# Patient Record
Sex: MALE | Race: WHITE | NOT HISPANIC OR LATINO | Employment: FULL TIME | URBAN - NONMETROPOLITAN AREA
[De-identification: names, ages, dates, MRNs, and addresses within clinical notes are randomized per-mention and may not be internally consistent; named-entity substitution may affect disease eponyms.]

---

## 2023-02-02 PROBLEM — R73.9 ELEVATED BLOOD SUGAR: Status: ACTIVE | Noted: 2023-02-02

## 2023-02-02 PROBLEM — G25.81 RLS (RESTLESS LEGS SYNDROME): Status: ACTIVE | Noted: 2023-02-02

## 2023-02-02 PROBLEM — N52.9 ERECTILE DYSFUNCTION: Status: ACTIVE | Noted: 2023-02-02

## 2023-02-02 PROBLEM — M17.9 KNEE OSTEOARTHRITIS: Status: ACTIVE | Noted: 2023-02-02

## 2023-02-02 PROBLEM — H26.9 CATARACT, LEFT: Status: ACTIVE | Noted: 2023-02-02

## 2023-02-02 PROBLEM — J45.909 ASTHMA (HHS-HCC): Status: ACTIVE | Noted: 2023-02-02

## 2023-02-02 PROBLEM — J06.9 URI WITH COUGH AND CONGESTION: Status: ACTIVE | Noted: 2023-02-02

## 2023-02-02 PROBLEM — I10 HYPERTENSION: Status: ACTIVE | Noted: 2023-02-02

## 2023-02-02 PROBLEM — E29.1 TESTICULAR HYPOFUNCTION: Status: ACTIVE | Noted: 2023-02-02

## 2023-02-02 PROBLEM — U07.1 COVID-19 VIRUS INFECTION: Status: ACTIVE | Noted: 2023-02-02

## 2023-02-02 PROBLEM — M67.432 GANGLION CYST OF BOTH WRISTS: Status: ACTIVE | Noted: 2023-02-02

## 2023-02-02 PROBLEM — M75.102 LEFT ROTATOR CUFF TEAR: Status: ACTIVE | Noted: 2023-02-02

## 2023-02-02 PROBLEM — M67.431 GANGLION CYST OF BOTH WRISTS: Status: ACTIVE | Noted: 2023-02-02

## 2023-02-02 PROBLEM — J01.00 ACUTE NON-RECURRENT MAXILLARY SINUSITIS: Status: ACTIVE | Noted: 2023-02-02

## 2023-02-02 PROBLEM — H66.91 OTITIS MEDIA, RIGHT: Status: ACTIVE | Noted: 2023-02-02

## 2023-02-02 RX ORDER — AMLODIPINE BESYLATE 5 MG/1
1 TABLET ORAL
COMMUNITY
Start: 2016-07-14 | End: 2024-02-06 | Stop reason: SDUPTHER

## 2023-02-02 RX ORDER — LOSARTAN POTASSIUM AND HYDROCHLOROTHIAZIDE 12.5; 1 MG/1; MG/1
1 TABLET ORAL NIGHTLY
COMMUNITY
Start: 2015-06-24 | End: 2024-02-06 | Stop reason: SDUPTHER

## 2023-02-02 RX ORDER — TADALAFIL 10 MG/1
10 TABLET ORAL DAILY PRN
COMMUNITY
Start: 2021-10-07

## 2023-02-02 RX ORDER — ALBUTEROL SULFATE 90 UG/1
AEROSOL, METERED RESPIRATORY (INHALATION)
COMMUNITY
Start: 2022-10-08

## 2023-02-14 RX ORDER — ROPINIROLE 0.25 MG/1
0.25 TABLET, FILM COATED ORAL NIGHTLY
COMMUNITY

## 2023-04-04 ENCOUNTER — OFFICE VISIT (OUTPATIENT)
Dept: PRIMARY CARE | Facility: CLINIC | Age: 53
End: 2023-04-04
Payer: COMMERCIAL

## 2023-04-04 VITALS
OXYGEN SATURATION: 95 % | RESPIRATION RATE: 14 BRPM | DIASTOLIC BLOOD PRESSURE: 70 MMHG | HEART RATE: 67 BPM | HEIGHT: 69 IN | BODY MASS INDEX: 31.99 KG/M2 | SYSTOLIC BLOOD PRESSURE: 124 MMHG | WEIGHT: 216 LBS | TEMPERATURE: 97.7 F

## 2023-04-04 DIAGNOSIS — J01.90 ACUTE NON-RECURRENT SINUSITIS, UNSPECIFIED LOCATION: ICD-10-CM

## 2023-04-04 DIAGNOSIS — I10 PRIMARY HYPERTENSION: Primary | ICD-10-CM

## 2023-04-04 DIAGNOSIS — G25.81 RLS (RESTLESS LEGS SYNDROME): ICD-10-CM

## 2023-04-04 PROBLEM — M17.0 PRIMARY OSTEOARTHRITIS OF BOTH KNEES: Status: ACTIVE | Noted: 2017-09-25

## 2023-04-04 PROBLEM — G51.0 BELL'S PALSY: Status: ACTIVE | Noted: 2022-10-29

## 2023-04-04 PROBLEM — Z86.59 H/O: DEPRESSION: Status: ACTIVE | Noted: 2021-01-14

## 2023-04-04 PROCEDURE — 1036F TOBACCO NON-USER: CPT | Performed by: FAMILY MEDICINE

## 2023-04-04 PROCEDURE — 99214 OFFICE O/P EST MOD 30 MIN: CPT | Performed by: FAMILY MEDICINE

## 2023-04-04 PROCEDURE — 3074F SYST BP LT 130 MM HG: CPT | Performed by: FAMILY MEDICINE

## 2023-04-04 PROCEDURE — 3078F DIAST BP <80 MM HG: CPT | Performed by: FAMILY MEDICINE

## 2023-04-04 RX ORDER — DOXYCYCLINE 100 MG/1
CAPSULE ORAL
Qty: 14 CAPSULE | Refills: 0 | Status: SHIPPED | OUTPATIENT
Start: 2023-04-04 | End: 2023-10-06 | Stop reason: ALTCHOICE

## 2023-04-04 ASSESSMENT — PAIN SCALES - GENERAL: PAINLEVEL: 0-NO PAIN

## 2023-04-04 NOTE — PROGRESS NOTES
"Subjective   Patient ID: Ted Salamanca Jr. is a 52 y.o. male who presents for Hypertension, Testosterone, Earache (Right ear), Nasal Congestion (Symptoms started last Wednesday, no fevers known ), and Bell's Palsy .    HPI   Ted was seen today for 6-month follow-up of his hypertension, restless leg symptoms, mild asthma.  Medication(s) are being taken and tolerated as prescribed, without concerns, list reconciled today.    Since his last visit, he had a fairly significant case of Bell's palsy, right-sided, symptoms not completely resolved.  His symptoms occurred within a week or 2 of an upper respiratory infection.    Labs from October reviewed, all were excellent.    His only minor concern today is that he has had about a week of upper respiratory congestion, right otalgia, and an occasionally productive cough, without fever.  Review of Systems  The full, 10+ multi-organ review of systems, is within normal limits with the exception of what is noted above in HPI.  Objective   /76 (BP Location: Right arm, Patient Position: Sitting, BP Cuff Size: Adult)   Pulse 67   Temp 36.5 °C (97.7 °F) (Temporal)   Resp 14   Ht 1.753 m (5' 9\")   Wt 98 kg (216 lb)   SpO2 95%   BMI 31.90 kg/m²     Physical Exam    Assessment/Plan     Hypertension--- since today's blood pressures are at goal, I have recommended continuing the current treatment regimen, including medication as noted above, as well as a low salt, low-fat, high-fiber diet.  Exercise is to be continued as able and tolerated.  We will continue to follow the high blood pressure on an every six-month basis, and address additional needs should they arise.    Restless leg symptoms, well controlled on low-dose Requip.    Status post right-sided Bell's palsy, hope for some improvement between now and the next visit.    Sinusitis symptoms, I printed a prescription for doxycycline, he will wait another 5 to 7 days, start only if symptoms worsen.    Follow-up in 6 " months, fasting.

## 2023-10-04 ASSESSMENT — ENCOUNTER SYMPTOMS
BLURRED VISION: 0
PND: 0
PALPITATIONS: 0
HEADACHES: 0
SWEATS: 0
SHORTNESS OF BREATH: 0
NECK PAIN: 0
HYPERTENSION: 1
ORTHOPNEA: 0

## 2023-10-06 ENCOUNTER — LAB (OUTPATIENT)
Dept: LAB | Facility: LAB | Age: 53
End: 2023-10-06
Payer: COMMERCIAL

## 2023-10-06 ENCOUNTER — OFFICE VISIT (OUTPATIENT)
Dept: PRIMARY CARE | Facility: CLINIC | Age: 53
End: 2023-10-06
Payer: COMMERCIAL

## 2023-10-06 VITALS
HEART RATE: 68 BPM | RESPIRATION RATE: 16 BRPM | DIASTOLIC BLOOD PRESSURE: 73 MMHG | OXYGEN SATURATION: 95 % | SYSTOLIC BLOOD PRESSURE: 116 MMHG | TEMPERATURE: 97.4 F | WEIGHT: 215.4 LBS | BODY MASS INDEX: 31.81 KG/M2

## 2023-10-06 DIAGNOSIS — I10 PRIMARY HYPERTENSION: ICD-10-CM

## 2023-10-06 DIAGNOSIS — G25.81 RLS (RESTLESS LEGS SYNDROME): ICD-10-CM

## 2023-10-06 DIAGNOSIS — Z12.5 SCREENING PSA (PROSTATE SPECIFIC ANTIGEN): ICD-10-CM

## 2023-10-06 DIAGNOSIS — Z23 IMMUNIZATION DUE: Primary | ICD-10-CM

## 2023-10-06 PROCEDURE — 81003 URINALYSIS AUTO W/O SCOPE: CPT

## 2023-10-06 PROCEDURE — 80061 LIPID PANEL: CPT

## 2023-10-06 PROCEDURE — 99214 OFFICE O/P EST MOD 30 MIN: CPT | Performed by: FAMILY MEDICINE

## 2023-10-06 PROCEDURE — 80053 COMPREHEN METABOLIC PANEL: CPT

## 2023-10-06 PROCEDURE — 90472 IMMUNIZATION ADMIN EACH ADD: CPT | Performed by: FAMILY MEDICINE

## 2023-10-06 PROCEDURE — 3078F DIAST BP <80 MM HG: CPT | Performed by: FAMILY MEDICINE

## 2023-10-06 PROCEDURE — 90471 IMMUNIZATION ADMIN: CPT | Performed by: FAMILY MEDICINE

## 2023-10-06 PROCEDURE — 84443 ASSAY THYROID STIM HORMONE: CPT

## 2023-10-06 PROCEDURE — 90750 HZV VACC RECOMBINANT IM: CPT | Performed by: FAMILY MEDICINE

## 2023-10-06 PROCEDURE — 90686 IIV4 VACC NO PRSV 0.5 ML IM: CPT | Performed by: FAMILY MEDICINE

## 2023-10-06 PROCEDURE — 84153 ASSAY OF PSA TOTAL: CPT

## 2023-10-06 PROCEDURE — 85025 COMPLETE CBC W/AUTO DIFF WBC: CPT

## 2023-10-06 PROCEDURE — 36415 COLL VENOUS BLD VENIPUNCTURE: CPT

## 2023-10-06 PROCEDURE — 1036F TOBACCO NON-USER: CPT | Performed by: FAMILY MEDICINE

## 2023-10-06 PROCEDURE — 3074F SYST BP LT 130 MM HG: CPT | Performed by: FAMILY MEDICINE

## 2023-10-06 ASSESSMENT — PATIENT HEALTH QUESTIONNAIRE - PHQ9
2. FEELING DOWN, DEPRESSED OR HOPELESS: NOT AT ALL
1. LITTLE INTEREST OR PLEASURE IN DOING THINGS: NOT AT ALL
SUM OF ALL RESPONSES TO PHQ9 QUESTIONS 1 AND 2: 0

## 2023-10-06 ASSESSMENT — PAIN SCALES - GENERAL: PAINLEVEL: 0-NO PAIN

## 2023-10-06 NOTE — PROGRESS NOTES
Answers submitted by the patient for this visit:  High Blood Pressure Questionnaire (Submitted on 10/4/2023)  Chief Complaint: Hypertension  Chronicity: chronic  Onset: more than 1 year ago  Progression since onset: unchanged  Condition status: controlled  anxiety: No  blurred vision: No  chest pain: No  headaches: No  malaise/fatigue: No  neck pain: No  orthopnea: No  palpitations: No  peripheral edema: No  PND: No  shortness of breath: No  sweats: No  Agents associated with hypertension: no associated agents  CAD risks: diabetes mellitus, family history  Compliance problems: no compliance problems

## 2023-10-06 NOTE — PROGRESS NOTES
Subjective   Patient ID: Ted Salamanca Jr. is a 53 y.o. male who presents for Hypertension.    HPI   Ted was seen today for a routine follow-up of his hypertension, restless leg symptoms.  Medication(s) are being taken and tolerated as prescribed, without concerns, list reconciled today.  There are no complaints of chest pain, shortness of breath, lower extremity edema, or exertional concerns  Work is going well, he has a less physical, less stressful job, which has been great for him.  He has a history of multiple orthopedic issues, that have done well postsurgery.  He takes ropinirole for his restless leg symptoms most nights, occasionally can get by without.  Blood pressures have been excellent, he is fasting for blood work, would like a flu vaccine.  Colonoscopy was normal in January of this year  Review of Systems  The full review of systems is negative with the exception of what is noted in HPI    Objective   /73 (BP Location: Right arm, Patient Position: Sitting, BP Cuff Size: Adult)   Pulse 68   Temp 36.3 °C (97.4 °F) (Temporal)   Resp 16   Wt 97.7 kg (215 lb 6.4 oz)   SpO2 95%   BMI 31.81 kg/m²     Physical Exam  Constitutional/General appearance: alert, oriented, well-appearing, in no distress  Head and face exam is normal  No scleral icterus or conjunctival erythema present  Hearing is grossly normal  Respiratory effort is normal, no dyspnea noted  Cortical function is normal  Mood, affect, are pleasant, appropriate, and interactive.  Insight is normal  Cardiac exam reveals a regular rate and rhythm, no murmurs, rubs or gallops present.   Lungs are clear bilaterally.    No lower extremity edema present.    Assessment/Plan     Hypertension--- since today's blood pressures are at goal, I have recommended continuing the current treatment regimen, including medication as noted above, as well as a low salt, low-fat, high-fiber diet.  Exercise is to be continued as able and tolerated.  We will  continue to follow the high blood pressure on an every six-month basis, and address additional needs should they arise.    Restless leg symptoms, well controlled on 0.25 mg of ropinirole    Flu and first Shingrix given today  Follow-up in 6 months    **Portions of this medical record have been created using voice recognition software and may have minor errors which are inherent in voice recognition systems. It has not been fully edited for typographical or grammatical errors**

## 2023-10-07 LAB
ALBUMIN SERPL BCP-MCNC: 4.6 G/DL (ref 3.4–5)
ALP SERPL-CCNC: 44 U/L (ref 33–120)
ALT SERPL W P-5'-P-CCNC: 17 U/L (ref 10–52)
ANION GAP SERPL CALC-SCNC: 14 MMOL/L (ref 10–20)
APPEARANCE UR: ABNORMAL
AST SERPL W P-5'-P-CCNC: 17 U/L (ref 9–39)
BASOPHILS # BLD AUTO: 0.03 X10*3/UL (ref 0–0.1)
BASOPHILS NFR BLD AUTO: 0.4 %
BILIRUB SERPL-MCNC: 0.7 MG/DL (ref 0–1.2)
BILIRUB UR STRIP.AUTO-MCNC: NEGATIVE MG/DL
BUN SERPL-MCNC: 13 MG/DL (ref 6–23)
CALCIUM SERPL-MCNC: 9.4 MG/DL (ref 8.6–10.6)
CHLORIDE SERPL-SCNC: 105 MMOL/L (ref 98–107)
CHOLEST SERPL-MCNC: 167 MG/DL (ref 0–199)
CHOLESTEROL/HDL RATIO: 3.5
CO2 SERPL-SCNC: 28 MMOL/L (ref 21–32)
COLOR UR: ABNORMAL
CREAT SERPL-MCNC: 1.15 MG/DL (ref 0.5–1.3)
EOSINOPHIL # BLD AUTO: 0.14 X10*3/UL (ref 0–0.7)
EOSINOPHIL NFR BLD AUTO: 1.9 %
ERYTHROCYTE [DISTWIDTH] IN BLOOD BY AUTOMATED COUNT: 12.2 % (ref 11.5–14.5)
GFR SERPL CREATININE-BSD FRML MDRD: 77 ML/MIN/1.73M*2
GLUCOSE SERPL-MCNC: 110 MG/DL (ref 74–99)
GLUCOSE UR STRIP.AUTO-MCNC: NEGATIVE MG/DL
HCT VFR BLD AUTO: 47.9 % (ref 41–52)
HDLC SERPL-MCNC: 48.4 MG/DL
HGB BLD-MCNC: 16.2 G/DL (ref 13.5–17.5)
IMM GRANULOCYTES # BLD AUTO: 0.01 X10*3/UL (ref 0–0.7)
IMM GRANULOCYTES NFR BLD AUTO: 0.1 % (ref 0–0.9)
KETONES UR STRIP.AUTO-MCNC: NEGATIVE MG/DL
LDLC SERPL CALC-MCNC: 97 MG/DL (ref 140–190)
LEUKOCYTE ESTERASE UR QL STRIP.AUTO: NEGATIVE
LYMPHOCYTES # BLD AUTO: 1.78 X10*3/UL (ref 1.2–4.8)
LYMPHOCYTES NFR BLD AUTO: 24.8 %
MCH RBC QN AUTO: 32.9 PG (ref 26–34)
MCHC RBC AUTO-ENTMCNC: 33.8 G/DL (ref 32–36)
MCV RBC AUTO: 97 FL (ref 80–100)
MONOCYTES # BLD AUTO: 0.71 X10*3/UL (ref 0.1–1)
MONOCYTES NFR BLD AUTO: 9.9 %
NEUTROPHILS # BLD AUTO: 4.52 X10*3/UL (ref 1.2–7.7)
NEUTROPHILS NFR BLD AUTO: 62.9 %
NITRITE UR QL STRIP.AUTO: NEGATIVE
NON HDL CHOLESTEROL: 119 MG/DL (ref 0–149)
NRBC BLD-RTO: 0 /100 WBCS (ref 0–0)
PH UR STRIP.AUTO: 6 [PH]
PLATELET # BLD AUTO: 248 X10*3/UL (ref 150–450)
PMV BLD AUTO: 10.5 FL (ref 7.5–11.5)
POTASSIUM SERPL-SCNC: 3.7 MMOL/L (ref 3.5–5.3)
PROT SERPL-MCNC: 7 G/DL (ref 6.4–8.2)
PROT UR STRIP.AUTO-MCNC: NEGATIVE MG/DL
PSA SERPL-MCNC: 0.8 NG/ML
RBC # BLD AUTO: 4.92 X10*6/UL (ref 4.5–5.9)
RBC # UR STRIP.AUTO: NEGATIVE /UL
SODIUM SERPL-SCNC: 143 MMOL/L (ref 136–145)
SP GR UR STRIP.AUTO: 1.02
TRIGL SERPL-MCNC: 109 MG/DL (ref 0–149)
TSH SERPL-ACNC: 2.39 MIU/L (ref 0.44–3.98)
UROBILINOGEN UR STRIP.AUTO-MCNC: <2 MG/DL
VLDL: 22 MG/DL (ref 0–40)
WBC # BLD AUTO: 7.2 X10*3/UL (ref 4.4–11.3)

## 2024-02-06 DIAGNOSIS — I10 HYPERTENSION, UNSPECIFIED TYPE: ICD-10-CM

## 2024-02-06 RX ORDER — AMLODIPINE BESYLATE 5 MG/1
5 TABLET ORAL
Qty: 90 TABLET | Refills: 1 | Status: SHIPPED | OUTPATIENT
Start: 2024-02-06

## 2024-02-06 RX ORDER — LOSARTAN POTASSIUM AND HYDROCHLOROTHIAZIDE 12.5; 1 MG/1; MG/1
1 TABLET ORAL NIGHTLY
Qty: 90 TABLET | Refills: 1 | Status: SHIPPED | OUTPATIENT
Start: 2024-02-06

## 2024-03-18 ENCOUNTER — TELEPHONE (OUTPATIENT)
Dept: PRIMARY CARE | Facility: CLINIC | Age: 54
End: 2024-03-18
Payer: COMMERCIAL

## 2024-03-18 NOTE — TELEPHONE ENCOUNTER
Patient dropped off FMLA paperwork, please fax to the number on form once completed and then call patient to  after.

## 2024-03-28 NOTE — TELEPHONE ENCOUNTER
----- Message from Natalie Sultana CMA sent at 3/28/2024  8:39 AM EDT -----  Regarding: FW: NOLAN  Contact: [phone]    ----- Message -----  From: Ted Salamanca Jr.  Sent: 3/28/2024   8:37 AM EDT  To: [place] [id number] Clinical Support Staff  Subject: NOLAN                                             Hi, I'm just inquiring about the [facility] paperwork. March 28th was the deadline to return.    Thank you   Ted Salmaanca

## 2024-03-28 NOTE — TELEPHONE ENCOUNTER
I completed it earlier this week...  (Was so he could miss work for his son Lonnie's health issues)

## 2024-04-09 ENCOUNTER — OFFICE VISIT (OUTPATIENT)
Dept: PRIMARY CARE | Facility: CLINIC | Age: 54
End: 2024-04-09
Payer: COMMERCIAL

## 2024-04-09 ENCOUNTER — LAB (OUTPATIENT)
Dept: LAB | Facility: LAB | Age: 54
End: 2024-04-09
Payer: COMMERCIAL

## 2024-04-09 VITALS
BODY MASS INDEX: 31.16 KG/M2 | TEMPERATURE: 97.2 F | HEART RATE: 64 BPM | OXYGEN SATURATION: 95 % | WEIGHT: 211 LBS | DIASTOLIC BLOOD PRESSURE: 71 MMHG | SYSTOLIC BLOOD PRESSURE: 115 MMHG

## 2024-04-09 DIAGNOSIS — Z23 IMMUNIZATION DUE: ICD-10-CM

## 2024-04-09 DIAGNOSIS — I10 PRIMARY HYPERTENSION: ICD-10-CM

## 2024-04-09 DIAGNOSIS — R73.9 ELEVATED BLOOD SUGAR: ICD-10-CM

## 2024-04-09 DIAGNOSIS — I10 PRIMARY HYPERTENSION: Primary | ICD-10-CM

## 2024-04-09 DIAGNOSIS — G25.81 RLS (RESTLESS LEGS SYNDROME): ICD-10-CM

## 2024-04-09 LAB
ALBUMIN SERPL BCP-MCNC: 4.4 G/DL (ref 3.4–5)
ALP SERPL-CCNC: 42 U/L (ref 33–120)
ALT SERPL W P-5'-P-CCNC: 16 U/L (ref 10–52)
ANION GAP SERPL CALC-SCNC: 12 MMOL/L (ref 10–20)
AST SERPL W P-5'-P-CCNC: 14 U/L (ref 9–39)
BILIRUB SERPL-MCNC: 0.7 MG/DL (ref 0–1.2)
BUN SERPL-MCNC: 23 MG/DL (ref 6–23)
CALCIUM SERPL-MCNC: 9.4 MG/DL (ref 8.6–10.6)
CHLORIDE SERPL-SCNC: 104 MMOL/L (ref 98–107)
CO2 SERPL-SCNC: 31 MMOL/L (ref 21–32)
CREAT SERPL-MCNC: 1.14 MG/DL (ref 0.5–1.3)
EGFRCR SERPLBLD CKD-EPI 2021: 78 ML/MIN/1.73M*2
EST. AVERAGE GLUCOSE BLD GHB EST-MCNC: 88 MG/DL
GLUCOSE SERPL-MCNC: 106 MG/DL (ref 74–99)
HBA1C MFR BLD: 4.7 %
POTASSIUM SERPL-SCNC: 4.4 MMOL/L (ref 3.5–5.3)
PROT SERPL-MCNC: 6.8 G/DL (ref 6.4–8.2)
SODIUM SERPL-SCNC: 143 MMOL/L (ref 136–145)

## 2024-04-09 PROCEDURE — 90472 IMMUNIZATION ADMIN EACH ADD: CPT | Performed by: FAMILY MEDICINE

## 2024-04-09 PROCEDURE — 80053 COMPREHEN METABOLIC PANEL: CPT

## 2024-04-09 PROCEDURE — 90715 TDAP VACCINE 7 YRS/> IM: CPT | Performed by: FAMILY MEDICINE

## 2024-04-09 PROCEDURE — 3078F DIAST BP <80 MM HG: CPT | Performed by: FAMILY MEDICINE

## 2024-04-09 PROCEDURE — 83036 HEMOGLOBIN GLYCOSYLATED A1C: CPT

## 2024-04-09 PROCEDURE — 90471 IMMUNIZATION ADMIN: CPT | Performed by: FAMILY MEDICINE

## 2024-04-09 PROCEDURE — 3074F SYST BP LT 130 MM HG: CPT | Performed by: FAMILY MEDICINE

## 2024-04-09 PROCEDURE — 1036F TOBACCO NON-USER: CPT | Performed by: FAMILY MEDICINE

## 2024-04-09 PROCEDURE — 90750 HZV VACC RECOMBINANT IM: CPT | Performed by: FAMILY MEDICINE

## 2024-04-09 PROCEDURE — 36415 COLL VENOUS BLD VENIPUNCTURE: CPT

## 2024-04-09 PROCEDURE — 99214 OFFICE O/P EST MOD 30 MIN: CPT | Performed by: FAMILY MEDICINE

## 2024-04-09 NOTE — PROGRESS NOTES
Subjective   Patient ID: Ted Salamanca Jr. is a 53 y.o. male who presents for hypertension, restless leg symptoms    HPI   Ted was seen today for a 6-month follow-up of his blood pressure, restless leg medications.  Medication(s) are being taken and tolerated as prescribed, without concerns, list reconciled today.  There are no complaints of chest pain, shortness of breath, lower extremity edema, or exertional concerns  He overall feels well, has no new concerns today.  Since his last visit he has struggled with plantar fasciitis, seems to be coming out of it with conservative treatment.  Previous labs reviewed, all were reassuring with the exception of his fasting blood sugar which was 110.  He does have a family history of diabetes (both parents).  He is fasting today  PSA is up-to-date, normal as of October 2023.  He does have nocturia, urgency, weak stream with hesitancy at times.  Review of Systems  The full review of systems is negative with the exception of what is noted in HPI    Objective   /71 (BP Location: Right arm, Patient Position: Sitting, BP Cuff Size: Large adult)   Pulse 64   Temp 36.2 °C (97.2 °F) (Temporal)   Wt 95.7 kg (211 lb)   SpO2 95%   BMI 31.16 kg/m²     Physical Exam  Cardiac exam reveals a regular rate and rhythm, no murmurs, rubs or gallops present.   Lungs are clear bilaterally.    No lower extremity edema present.  Constitutional/General appearance: alert, oriented, well-appearing, in no distress  Head and face exam is normal  No scleral icterus or conjunctival erythema present  Hearing is grossly normal  Respiratory effort is normal, no dyspnea noted  Cortical function is normal  Mood, affect, are pleasant, appropriate, and interactive.  Insight is normal    Assessment/Plan     Hypertension--- since today's blood pressures are at goal, I have recommended continuing the current treatment regimen, including medication as noted above, as well as a low salt, low-fat,  high-fiber diet.  Exercise is to be continued as able and tolerated.  We will continue to follow the high blood pressure on an every six-month basis, and address additional needs should they arise.    Impaired glucose tolerance, will check a sugar and A1c today, full fasting labs and PSA next check.    Restless leg symptoms stable with nocturnal ropinirole  Shingrix No. 2 given  Boostrix updated as well    Follow-up in 6 months, fasting  **Portions of this medical record have been created using voice recognition software and may have minor errors which are inherent in voice recognition systems. It has not been fully edited for typographical or grammatical errors**

## 2024-06-29 DIAGNOSIS — I10 HYPERTENSION, UNSPECIFIED TYPE: ICD-10-CM

## 2024-07-01 RX ORDER — LOSARTAN POTASSIUM AND HYDROCHLOROTHIAZIDE 12.5; 1 MG/1; MG/1
1 TABLET ORAL NIGHTLY
Qty: 90 TABLET | Refills: 3 | Status: SHIPPED | OUTPATIENT
Start: 2024-07-01

## 2024-07-01 RX ORDER — AMLODIPINE BESYLATE 5 MG/1
5 TABLET ORAL DAILY
Qty: 90 TABLET | Refills: 3 | Status: SHIPPED | OUTPATIENT
Start: 2024-07-01

## 2024-10-23 ENCOUNTER — APPOINTMENT (OUTPATIENT)
Dept: PRIMARY CARE | Facility: CLINIC | Age: 54
End: 2024-10-23
Payer: COMMERCIAL

## 2024-10-23 ENCOUNTER — LAB (OUTPATIENT)
Dept: LAB | Facility: LAB | Age: 54
End: 2024-10-23
Payer: COMMERCIAL

## 2024-10-23 VITALS
OXYGEN SATURATION: 95 % | WEIGHT: 215.4 LBS | BODY MASS INDEX: 31.81 KG/M2 | SYSTOLIC BLOOD PRESSURE: 115 MMHG | DIASTOLIC BLOOD PRESSURE: 78 MMHG | TEMPERATURE: 97.5 F | HEART RATE: 72 BPM

## 2024-10-23 DIAGNOSIS — R73.9 ELEVATED BLOOD SUGAR: ICD-10-CM

## 2024-10-23 DIAGNOSIS — Z12.5 SCREENING PSA (PROSTATE SPECIFIC ANTIGEN): ICD-10-CM

## 2024-10-23 DIAGNOSIS — I10 PRIMARY HYPERTENSION: ICD-10-CM

## 2024-10-23 DIAGNOSIS — I83.893 VARICOSE VEINS OF BILATERAL LOWER EXTREMITIES WITH OTHER COMPLICATIONS: ICD-10-CM

## 2024-10-23 DIAGNOSIS — G25.81 RLS (RESTLESS LEGS SYNDROME): ICD-10-CM

## 2024-10-23 DIAGNOSIS — Z23 IMMUNIZATION DUE: Primary | ICD-10-CM

## 2024-10-23 PROCEDURE — 99214 OFFICE O/P EST MOD 30 MIN: CPT | Performed by: FAMILY MEDICINE

## 2024-10-23 PROCEDURE — 90656 IIV3 VACC NO PRSV 0.5 ML IM: CPT | Performed by: FAMILY MEDICINE

## 2024-10-23 PROCEDURE — 3078F DIAST BP <80 MM HG: CPT | Performed by: FAMILY MEDICINE

## 2024-10-23 PROCEDURE — 1036F TOBACCO NON-USER: CPT | Performed by: FAMILY MEDICINE

## 2024-10-23 PROCEDURE — 3074F SYST BP LT 130 MM HG: CPT | Performed by: FAMILY MEDICINE

## 2024-10-23 PROCEDURE — 36415 COLL VENOUS BLD VENIPUNCTURE: CPT

## 2024-10-23 PROCEDURE — 80053 COMPREHEN METABOLIC PANEL: CPT

## 2024-10-23 PROCEDURE — 80061 LIPID PANEL: CPT

## 2024-10-23 PROCEDURE — 83036 HEMOGLOBIN GLYCOSYLATED A1C: CPT

## 2024-10-23 PROCEDURE — 90471 IMMUNIZATION ADMIN: CPT | Performed by: FAMILY MEDICINE

## 2024-10-23 PROCEDURE — 84153 ASSAY OF PSA TOTAL: CPT

## 2024-10-23 RX ORDER — ROPINIROLE 0.25 MG/1
0.25 TABLET, FILM COATED ORAL NIGHTLY
Qty: 90 TABLET | Refills: 3 | Status: SHIPPED | OUTPATIENT
Start: 2024-10-23

## 2024-10-23 ASSESSMENT — ENCOUNTER SYMPTOMS
SWEATS: 0
NECK PAIN: 0
HEADACHES: 0
BLURRED VISION: 0
HYPERTENSION: 1
SHORTNESS OF BREATH: 0
PALPITATIONS: 0
PND: 0
ORTHOPNEA: 0

## 2024-10-23 NOTE — PROGRESS NOTES
Subjective   Patient ID: Ted Salamanca Jr. is a 54 y.o. male who presents for Follow-up (Ted is here to follow up on HTN/med check. Ted would like to get his flu shot today,. ).    HPI   Ted was seen today for a routine follow-up of his hypertension and restless leg medications.  He is taking and tolerating both as prescribed, he is comfortable with the efficacy and tolerability of all 3.  He is fasting for blood work, is due for a PSA.  He would like a flu vaccine.  His only minor concern today is that of increasingly bothersome right lower extremity varicose veins.  He was a thigh-high compression stocking, otherwise gets pain and swelling.  He has not had any superficial thrombophlebitis complications.  Previous labs reviewed  His right knee osteoarthritis pain is slowly becoming more bothersome.  He does have a history of a left total knee arthroplasty.  Review of Systems  The full, 10+ multi-organ review of systems, is within normal limits with the exception of what is noted above in HPI.  Objective   /78 (BP Location: Right arm, Patient Position: Sitting, BP Cuff Size: Large adult)   Pulse 72   Temp 36.4 °C (97.5 °F) (Temporal)   Wt 97.7 kg (215 lb 6.4 oz)   SpO2 95%   BMI 31.81 kg/m²     Physical Exam  Constitutional/General appearance: alert, oriented, well-appearing, in no distress  Head and face exam is normal  No scleral icterus or conjunctival erythema present  Hearing is grossly normal  Respiratory effort is normal, no dyspnea noted  Cortical function is normal  Mood, affect, are pleasant, appropriate, and interactive.  Insight is normal  Cardiac exam reveals a regular rate and rhythm, no murmurs, rubs or gallops present.   Lungs are clear bilaterally.    No lower extremity edema present.;  Right lower extremity reveals large medial varicosities, extending to his upper thigh.  No cords, erythema, palpable tenderness present.  Assessment/Plan     Continue current medications for hypertension  and RLS, all are working well.  Check fasting labs  Vascular surgery referral for symptomatic right lower extremity varicosities.  Continue compression stockings, elevation as needed.  Flu vaccine given    Follow-up in 6 months  **Portions of this medical record have been created using voice recognition software and may have minor errors which are inherent in voice recognition systems. It has not been fully edited for typographical or grammatical errors**

## 2024-10-24 LAB
ALBUMIN SERPL BCP-MCNC: 4.3 G/DL (ref 3.4–5)
ALP SERPL-CCNC: 43 U/L (ref 33–120)
ALT SERPL W P-5'-P-CCNC: 15 U/L (ref 10–52)
ANION GAP SERPL CALC-SCNC: 14 MMOL/L (ref 10–20)
AST SERPL W P-5'-P-CCNC: 16 U/L (ref 9–39)
BILIRUB SERPL-MCNC: 0.7 MG/DL (ref 0–1.2)
BUN SERPL-MCNC: 16 MG/DL (ref 6–23)
CALCIUM SERPL-MCNC: 9.2 MG/DL (ref 8.6–10.6)
CHLORIDE SERPL-SCNC: 104 MMOL/L (ref 98–107)
CHOLEST SERPL-MCNC: 166 MG/DL (ref 0–199)
CHOLESTEROL/HDL RATIO: 3.6
CO2 SERPL-SCNC: 28 MMOL/L (ref 21–32)
CREAT SERPL-MCNC: 1.08 MG/DL (ref 0.5–1.3)
EGFRCR SERPLBLD CKD-EPI 2021: 83 ML/MIN/1.73M*2
EST. AVERAGE GLUCOSE BLD GHB EST-MCNC: 85 MG/DL
GLUCOSE SERPL-MCNC: 107 MG/DL (ref 74–99)
HBA1C MFR BLD: 4.6 %
HDLC SERPL-MCNC: 46.2 MG/DL
LDLC SERPL CALC-MCNC: 96 MG/DL
NON HDL CHOLESTEROL: 120 MG/DL (ref 0–149)
POTASSIUM SERPL-SCNC: 4 MMOL/L (ref 3.5–5.3)
PROT SERPL-MCNC: 6.8 G/DL (ref 6.4–8.2)
PSA SERPL-MCNC: 0.82 NG/ML
SODIUM SERPL-SCNC: 142 MMOL/L (ref 136–145)
TRIGL SERPL-MCNC: 120 MG/DL (ref 0–149)
VLDL: 24 MG/DL (ref 0–40)

## 2024-12-05 ENCOUNTER — OFFICE VISIT (OUTPATIENT)
Dept: VASCULAR SURGERY | Facility: CLINIC | Age: 54
End: 2024-12-05
Payer: COMMERCIAL

## 2024-12-05 VITALS
HEIGHT: 69 IN | OXYGEN SATURATION: 96 % | DIASTOLIC BLOOD PRESSURE: 87 MMHG | HEART RATE: 75 BPM | BODY MASS INDEX: 32.3 KG/M2 | WEIGHT: 218.1 LBS | SYSTOLIC BLOOD PRESSURE: 129 MMHG

## 2024-12-05 DIAGNOSIS — I83.893 VARICOSE VEINS OF BILATERAL LOWER EXTREMITIES WITH OTHER COMPLICATIONS: ICD-10-CM

## 2024-12-05 PROCEDURE — 3079F DIAST BP 80-89 MM HG: CPT | Performed by: NURSE PRACTITIONER

## 2024-12-05 PROCEDURE — 3074F SYST BP LT 130 MM HG: CPT | Performed by: NURSE PRACTITIONER

## 2024-12-05 PROCEDURE — 3008F BODY MASS INDEX DOCD: CPT | Performed by: NURSE PRACTITIONER

## 2024-12-05 PROCEDURE — 99204 OFFICE O/P NEW MOD 45 MIN: CPT | Performed by: NURSE PRACTITIONER

## 2024-12-05 PROCEDURE — 1036F TOBACCO NON-USER: CPT | Performed by: NURSE PRACTITIONER

## 2024-12-05 PROCEDURE — 99214 OFFICE O/P EST MOD 30 MIN: CPT | Performed by: NURSE PRACTITIONER

## 2024-12-05 ASSESSMENT — ENCOUNTER SYMPTOMS
UNEXPECTED WEIGHT CHANGE: 0
NEUROLOGICAL NEGATIVE: 1
GASTROINTESTINAL NEGATIVE: 1
CHEST TIGHTNESS: 0
DEPRESSION: 0
PSYCHIATRIC NEGATIVE: 1
SHORTNESS OF BREATH: 0
MUSCULOSKELETAL NEGATIVE: 1
ACTIVITY CHANGE: 0
HEMATOLOGIC/LYMPHATIC NEGATIVE: 1
APPETITE CHANGE: 0
EYES NEGATIVE: 1
OCCASIONAL FEELINGS OF UNSTEADINESS: 0
ALLERGIC/IMMUNOLOGIC NEGATIVE: 1
LOSS OF SENSATION IN FEET: 0
ENDOCRINE NEGATIVE: 1
PALPITATIONS: 0

## 2024-12-05 ASSESSMENT — COLUMBIA-SUICIDE SEVERITY RATING SCALE - C-SSRS
6. HAVE YOU EVER DONE ANYTHING, STARTED TO DO ANYTHING, OR PREPARED TO DO ANYTHING TO END YOUR LIFE?: NO
1. IN THE PAST MONTH, HAVE YOU WISHED YOU WERE DEAD OR WISHED YOU COULD GO TO SLEEP AND NOT WAKE UP?: NO
2. HAVE YOU ACTUALLY HAD ANY THOUGHTS OF KILLING YOURSELF?: NO

## 2024-12-05 ASSESSMENT — PAIN SCALES - GENERAL: PAINLEVEL_OUTOF10: 0-NO PAIN

## 2024-12-05 NOTE — PROGRESS NOTES
NPV REASON: bulky varicose veins    CURRENT ENCOUNTER:  Ted Salamanca Jr. is 54 y.o. male here for painful varicose veins to BLE R>L. 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 Reports BLE heaviness, pain, RLS, cramps, edema with ankle twitching. The right leg has most of the bulky varicose veins and more symptoms. He is wearing compression stockings daily which does help some of the symptoms.     Previous vein procedures: yes right leg gsv ablation in 2014 at [facility] with Dr. Malik  Previous phlebitis/DVT/PE: no  Previous venous ulcers: no  Family hx of varicose veins: mother had varicose veins  Using medical grade compression stockings: yes daily for over 10 years  Previous varicose veins bleeding: no    RIGHT LEG C1 Telangiectasias or reticular veins, C2 Varicose Veins, C2r Recurrent Varicose Veins, and C3 Edema  RIGHT LEG PAIN 2, VARICOSE VEINS 3, VENOUS EDEMA 1, and USE OF COMPRESSION 3  RIGHT LEG CEAP: C3  RIGHT LEG VCSS SCORE: 9    LEFT LEG C1 Telangiectasias or reticular veins, C2 Varicose Veins, and C3 Edema  LEFT LEG PAIN 1, VARICOSE VEINS 3, VENOUS EDEMA 1, and USE OF COMPRESSION 3  LEFT LEG CEAP: C3  LEFT LEG VCSS SCORE: 8    Social: works as a . nonsmoker    PastMedHX:  Past Medical History:   Diagnosis Date    Arthritis     Asthma     Hypertension     Other specified cough 02/14/2018    Recurrent cough    Personal history of other diseases of the musculoskeletal system and connective tissue 03/14/2014    History of low back pain    Personal history of other diseases of the nervous system and sense organs 07/12/2022    History of acute otitis externa    Personal history of other diseases of the nervous system and sense organs 07/11/2022    History of ear pain    Personal history of other diseases of the respiratory system 03/13/2015    History of acute sinusitis    Personal history of other diseases of the respiratory system 12/28/2017    History of acute sinusitis    Personal  history of other mental and behavioral disorders 07/14/2016    History of depression    Sprain of other specified parts of unspecified knee, initial encounter 05/06/2014    Acute medial meniscal injury of knee    Visual impairment      Past Surgical History:   Procedure Laterality Date    APPENDECTOMY  05/06/2014    Appendectomy    EYE SURGERY      JOINT REPLACEMENT  01/02/2021    LYMPHADENECTOMY  05/06/2014    Lymphadenectomy    OTHER SURGICAL HISTORY  02/02/2021    Knee replacement    SINUS SURGERY      VASECTOMY  2002     Family History   Problem Relation Name Age of Onset    Heart disease Mother SABINA     Diabetes Mother SABINA     Hyperlipidemia Mother SABINA     Hypertension Mother SABINA     Heart attack Mother SABINA     Early natural death Mother SABINA     Heart disease Father Ted Sr.     Diabetes Father Ted Sr.     Hyperlipidemia Father Ted Sr.     Hypertension Father Ted Sr.     Heart attack Father Ted Sr.     Early natural death Father Ted Sr.      Social History     Socioeconomic History    Marital status:      Spouse name: Not on file    Number of children: Not on file    Years of education: Not on file    Highest education level: Not on file   Occupational History    Not on file   Tobacco Use    Smoking status: Never    Smokeless tobacco: Never   Substance and Sexual Activity    Alcohol use: Yes     Comment: I'll have a mixed drink about 3 times a year.    Drug use: Never    Sexual activity: Yes     Partners: Female     Birth control/protection: Surgical, Male Sterilization   Other Topics Concern    Not on file   Social History Narrative    Not on file     Social Drivers of Health     Financial Resource Strain: Not on file   Food Insecurity: Not on file   Transportation Needs: Not on file   Physical Activity: Not on file   Stress: Not on file   Social Connections: Not on file   Intimate Partner Violence: Not on file   Housing Stability: Not on file     Meds:     Current Outpatient  "Medications:     albuterol 90 mcg/actuation inhaler, Inhale., Disp: , Rfl:     amLODIPine (Norvasc) 5 mg tablet, TAKE 1 TABLET ONCE DAILY, Disp: 90 tablet, Rfl: 3    losartan-hydrochlorothiazide (Hyzaar) 100-12.5 mg tablet, TAKE 1 TABLET AT BEDTIME, Disp: 90 tablet, Rfl: 3    rOPINIRole (Requip) 0.25 mg tablet, Take 1 tablet (0.25 mg) by mouth once daily at bedtime. For restless leg syndrome, Disp: 90 tablet, Rfl: 3    tadalafil (Cialis) 10 mg tablet, Take 1 tablet (10 mg) by mouth once daily as needed (1 hour before activity as needed)., Disp: , Rfl:     Allergies:   No Known Allergies    ROS:  Review of Systems   Constitutional:  Negative for activity change, appetite change and unexpected weight change.   HENT: Negative.     Eyes: Negative.    Respiratory:  Negative for chest tightness and shortness of breath.    Cardiovascular:  Positive for leg swelling. Negative for chest pain and palpitations.   Gastrointestinal: Negative.    Endocrine: Negative.    Genitourinary: Negative.    Musculoskeletal: Negative.    Skin:         Bulky varicose veins to right leg and some to the left   Allergic/Immunologic: Negative.    Neurological: Negative.    Hematological: Negative.    Psychiatric/Behavioral: Negative.        Objective:  Vitals:  Vitals:    12/05/24 0841   BP: 129/87   Pulse:    SpO2:     /87 (BP Location: Right arm, Patient Position: Sitting, BP Cuff Size: Large adult)   Pulse 75   Ht 1.753 m (5' 9\")   Wt 98.9 kg (218 lb 1.6 oz)   SpO2 96%   BMI 32.21 kg/m²     Exam:  Physical Exam  Vitals reviewed.   HENT:      Head: Normocephalic and atraumatic.      Nose: Nose normal.      Mouth/Throat:      Mouth: Mucous membranes are moist.   Eyes:      Conjunctiva/sclera: Conjunctivae normal.   Cardiovascular:      Rate and Rhythm: Normal rate.      Pulses: Normal pulses.   Pulmonary:      Effort: Pulmonary effort is normal.   Musculoskeletal:         General: Normal range of motion.      Cervical back: Normal " range of motion.   Skin:     General: Skin is warm and dry.      Capillary Refill: Capillary refill takes less than 2 seconds.      Comments: Bulky varicose veins to right leg from anterior thigh to medial and anterior calf, left leg varicose veins. Reticular veins throughout both legs   Neurological:      General: No focal deficit present.      Mental Status: He is alert and oriented to person, place, and time.   Psychiatric:         Mood and Affect: Mood normal.         Behavior: Behavior normal.         Thought Content: Thought content normal.         Judgment: Judgment normal.     Labs:  Lab Results   Component Value Date    WBC 7.2 10/06/2023    WBC 6.7 10/03/2022    WBC 6.6 10/07/2021    HGB 16.2 10/06/2023    HGB 16.1 10/03/2022    HGB 15.8 10/07/2021    HCT 47.9 10/06/2023    HCT 44.1 10/03/2022    HCT 45.7 10/07/2021    MCV 97 10/06/2023    MCV 92 10/03/2022    MCV 93 10/07/2021     10/06/2023     Lab Results   Component Value Date    CREATININE 1.08 10/23/2024    CREATININE 1.14 04/09/2024    CREATININE 1.15 10/06/2023    BUN 16 10/23/2024    BUN 23 04/09/2024    BUN 13 10/06/2023     10/23/2024     04/09/2024     10/06/2023    K 4.0 10/23/2024    K 4.4 04/09/2024    K 3.7 10/06/2023     10/23/2024     04/09/2024     10/06/2023    CO2 28 10/23/2024    CO2 31 04/09/2024    CO2 28 10/06/2023       Imaging:                          Assessment & Plan:  Ted Salamanca Jr. is 54 y.o. male with a history of HTN, RLS who is presents with painful varicose veins to BLE R>L. 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 Reports BLE heaviness, pain, RLS, cramps, edema with ankle twitching. The right leg has most of the bulky varicose veins and more symptoms. He is wearing compression stockings daily which does help some of the symptoms.     Previous vein procedures: yes right leg gsv ablation in 2014 at [facility] with Dr. Malik  Previous phlebitis/DVT/PE: no  Previous  venous ulcers: no  Family hx of varicose veins: mother had varicose veins  Using medical grade compression stockings: yes daily for over 10 years  Previous varicose veins bleeding: no    12/5/24  RIGHT LEG CEAP: C3  RIGHT LEG VCSS SCORE: 9  LEFT LEG CEAP: C3  LEFT LEG VCSS SCORE: 8    It was a pleasure taking care of you today and appreciate your seeing us at our [facility] [place] [facility].     Today's plan is as follows:  1) Elevate your legs at rest  2) Wear 20-30mmHg compression stockings, on during the day when standing, off while sleeping  3) Complete a venous insufficiency ultrasound and follow up with me after. This appointment may be a virtual telephone visit to discuss your plan.    Yana Arcos, ANDREZ, APRN-CNP, AGPCNP-C, FNP-C, AGACNP-BC  Senior Nurse Practitioner, Vascular Surgery  [facility], [facility] [place]  [address] [facility], Office [phone]

## 2024-12-05 NOTE — PATIENT INSTRUCTIONS
It was a pleasure taking care of you today and appreciate your seeing us at our [facility] [place] [facility].     Today's plan is as follows:  1) Elevate your legs at rest  2) Wear 20-30mmHg compression stockings, on during the day when standing, off while sleeping  3) Complete a venous insufficiency ultrasound and follow up with me after. This appointment may be a virtual telephone visit to discuss your plan.    Please call the office with any questions at [phone].   You can speak to our secretaries or our clinical nurses for specific questions.   For Vein Center specific questions, you can also call [phone] or email at [email or web address]  If you need coordinating your appointments and testing you can do these at the  or by calling my office shortly after your visit.

## 2024-12-24 ENCOUNTER — HOSPITAL ENCOUNTER (OUTPATIENT)
Dept: VASCULAR MEDICINE | Facility: CLINIC | Age: 54
Discharge: HOME | End: 2024-12-24
Payer: COMMERCIAL

## 2024-12-24 DIAGNOSIS — I83.893 VARICOSE VEINS OF BILATERAL LOWER EXTREMITIES WITH OTHER COMPLICATIONS: ICD-10-CM

## 2024-12-24 PROCEDURE — 93970 EXTREMITY STUDY: CPT

## 2024-12-24 PROCEDURE — 93970 EXTREMITY STUDY: CPT | Performed by: SURGERY

## 2024-12-30 ENCOUNTER — OFFICE VISIT (OUTPATIENT)
Dept: URGENT CARE | Age: 54
End: 2024-12-30
Payer: COMMERCIAL

## 2024-12-30 VITALS
TEMPERATURE: 96.5 F | HEART RATE: 81 BPM | WEIGHT: 218 LBS | OXYGEN SATURATION: 96 % | SYSTOLIC BLOOD PRESSURE: 144 MMHG | DIASTOLIC BLOOD PRESSURE: 89 MMHG | BODY MASS INDEX: 32.19 KG/M2 | RESPIRATION RATE: 16 BRPM

## 2024-12-30 DIAGNOSIS — J01.00 ACUTE NON-RECURRENT MAXILLARY SINUSITIS: Primary | ICD-10-CM

## 2024-12-30 DIAGNOSIS — J45.20 MILD INTERMITTENT ASTHMA, UNSPECIFIED WHETHER COMPLICATED (HHS-HCC): ICD-10-CM

## 2024-12-30 PROCEDURE — 1036F TOBACCO NON-USER: CPT | Performed by: PHYSICIAN ASSISTANT

## 2024-12-30 PROCEDURE — 3077F SYST BP >= 140 MM HG: CPT | Performed by: PHYSICIAN ASSISTANT

## 2024-12-30 PROCEDURE — 99213 OFFICE O/P EST LOW 20 MIN: CPT | Performed by: PHYSICIAN ASSISTANT

## 2024-12-30 PROCEDURE — 3079F DIAST BP 80-89 MM HG: CPT | Performed by: PHYSICIAN ASSISTANT

## 2024-12-30 RX ORDER — ALBUTEROL SULFATE 90 UG/1
2 INHALANT RESPIRATORY (INHALATION) EVERY 6 HOURS PRN
Qty: 18 G | Refills: 0 | Status: SHIPPED | OUTPATIENT
Start: 2024-12-30 | End: 2025-12-30

## 2024-12-30 RX ORDER — FLUTICASONE PROPIONATE 50 MCG
1 SPRAY, SUSPENSION (ML) NASAL DAILY
Qty: 16 G | Refills: 0 | Status: SHIPPED | OUTPATIENT
Start: 2024-12-30 | End: 2025-01-29

## 2024-12-30 RX ORDER — AMOXICILLIN 875 MG/1
875 TABLET, FILM COATED ORAL 2 TIMES DAILY
Qty: 20 TABLET | Refills: 0 | Status: SHIPPED | OUTPATIENT
Start: 2024-12-30 | End: 2025-01-09

## 2024-12-30 ASSESSMENT — ENCOUNTER SYMPTOMS
EYE ITCHING: 0
SINUS PAIN: 1
EYE REDNESS: 0
ARTHRALGIAS: 0
SORE THROAT: 0
RHINORRHEA: 1
EYE PAIN: 0
SINUS PRESSURE: 1
FEVER: 0
VOMITING: 0
EYE DISCHARGE: 0
ABDOMINAL PAIN: 0
COUGH: 0
SINUS COMPLAINT: 1
CHEST TIGHTNESS: 0
JOINT SWELLING: 0
SHORTNESS OF BREATH: 0
FATIGUE: 0
DIARRHEA: 0
CHILLS: 0
NAUSEA: 0

## 2024-12-30 NOTE — PROGRESS NOTES
Subjective   Patient ID: Ted Salamanca Jr. is a 54 y.o. male. They present today with a chief complaint of Sinus Problem (Pressure, post nasal drip, started before thanksgiving).    History of Present Illness  Pt presented with lingering sinus symptoms and reports hx of recurrent sinus problem. He is using Sudafed and Afrin with minimal relief. Also requested refill of albuterol inhaler. Hx of asthma. Denies SOB, chest tightness. Denies fever, chill.       Sinus Problem  Associated symptoms: congestion and rhinorrhea    Associated symptoms: no abdominal pain, no chest pain, no cough, no diarrhea, no ear pain, no fatigue, no fever, no nausea, no rash, no shortness of breath, no sore throat and no vomiting        Past Medical History  Allergies as of 12/30/2024    (No Known Allergies)       (Not in a hospital admission)       Past Medical History:   Diagnosis Date    Arthritis     Asthma     Hypertension     Other specified cough 02/14/2018    Recurrent cough    Personal history of other diseases of the musculoskeletal system and connective tissue 03/14/2014    History of low back pain    Personal history of other diseases of the nervous system and sense organs 07/12/2022    History of acute otitis externa    Personal history of other diseases of the nervous system and sense organs 07/11/2022    History of ear pain    Personal history of other diseases of the respiratory system 03/13/2015    History of acute sinusitis    Personal history of other diseases of the respiratory system 12/28/2017    History of acute sinusitis    Personal history of other mental and behavioral disorders 07/14/2016    History of depression    Sprain of other specified parts of unspecified knee, initial encounter 05/06/2014    Acute medial meniscal injury of knee    Visual impairment        Past Surgical History:   Procedure Laterality Date    APPENDECTOMY  05/06/2014    Appendectomy    EYE SURGERY      JOINT REPLACEMENT  01/02/2021     LYMPHADENECTOMY  05/06/2014    Lymphadenectomy    OTHER SURGICAL HISTORY  02/02/2021    Knee replacement    SINUS SURGERY      VASECTOMY  2002        reports that he has never smoked. He has never used smokeless tobacco. He reports current alcohol use. He reports that he does not use drugs.    Review of Systems  Review of Systems   Constitutional:  Negative for chills, fatigue and fever.   HENT:  Positive for congestion, postnasal drip, rhinorrhea, sinus pressure and sinus pain. Negative for ear discharge, ear pain, sneezing and sore throat.    Eyes:  Negative for pain, discharge, redness and itching.   Respiratory:  Negative for cough, chest tightness and shortness of breath.    Cardiovascular:  Negative for chest pain.   Gastrointestinal:  Negative for abdominal pain, diarrhea, nausea and vomiting.   Musculoskeletal:  Negative for arthralgias and joint swelling.   Skin:  Negative for rash.                                  Objective    Vitals:    12/30/24 0918   BP: 144/89   Pulse: 81   Resp: 16   Temp: 35.8 °C (96.5 °F)   SpO2: 96%   Weight: 98.9 kg (218 lb)     No LMP for male patient.    Physical Exam  Constitutional:       Appearance: Normal appearance.   HENT:      Head: Normocephalic and atraumatic.      Right Ear: Tympanic membrane, ear canal and external ear normal.      Left Ear: Tympanic membrane, ear canal and external ear normal.      Nose: Congestion present. No rhinorrhea.      Right Sinus: Maxillary sinus tenderness and frontal sinus tenderness present.      Left Sinus: Maxillary sinus tenderness and frontal sinus tenderness present.      Mouth/Throat:      Pharynx: No oropharyngeal exudate or posterior oropharyngeal erythema.   Cardiovascular:      Rate and Rhythm: Normal rate and regular rhythm.      Heart sounds: No murmur heard.  Pulmonary:      Effort: Pulmonary effort is normal. No respiratory distress.      Breath sounds: No stridor. No wheezing, rhonchi or rales.   Lymphadenopathy:       Cervical: No cervical adenopathy.   Skin:     General: Skin is warm and dry.   Neurological:      Mental Status: He is alert and oriented to person, place, and time.   Psychiatric:         Mood and Affect: Mood normal.         Procedures    Point of Care Test & Imaging Results from this visit  No results found for this visit on 12/30/24.   No results found.    Diagnostic study results (if any) were reviewed by Svitlana Santos PA-C.    Assessment/Plan   Allergies, medications, history, and pertinent labs/EKGs/Imaging reviewed by Svitlana Santos PA-C.     Medical Decision Making  Lingering sinus symptoms for longer than 4wks. Cannot r/o bacterial infection and will add Abx  Refilled albuterol inhaler but low suspicion for flare up based on exam and symptoms    Orders and Diagnoses  Diagnoses and all orders for this visit:  Acute non-recurrent maxillary sinusitis  -     amoxicillin (Amoxil) 875 mg tablet; Take 1 tablet (875 mg) by mouth 2 times a day for 10 days.  -     fluticasone (Flonase) 50 mcg/actuation nasal spray; Administer 1 spray into each nostril once daily. Shake gently. Before first use, prime pump. After use, clean tip and replace cap.  Mild intermittent asthma, unspecified whether complicated ([facility])  -     albuterol 90 mcg/actuation inhaler; Inhale 2 puffs every 6 hours if needed for wheezing.      Medical Admin Record      Patient disposition: Home    Electronically signed by Svitlana Santos PA-C  9:35 AM

## 2025-01-16 ENCOUNTER — TELEMEDICINE (OUTPATIENT)
Dept: VASCULAR SURGERY | Facility: CLINIC | Age: 55
End: 2025-01-16
Payer: COMMERCIAL

## 2025-01-16 DIAGNOSIS — I83.893 VARICOSE VEINS OF BILATERAL LOWER EXTREMITIES WITH OTHER COMPLICATIONS: Primary | ICD-10-CM

## 2025-01-16 ASSESSMENT — ENCOUNTER SYMPTOMS
SHORTNESS OF BREATH: 0
PALPITATIONS: 0
MUSCULOSKELETAL NEGATIVE: 1
EYES NEGATIVE: 1
GASTROINTESTINAL NEGATIVE: 1
NEUROLOGICAL NEGATIVE: 1
PSYCHIATRIC NEGATIVE: 1
CHEST TIGHTNESS: 0
UNEXPECTED WEIGHT CHANGE: 0
APPETITE CHANGE: 0
HEMATOLOGIC/LYMPHATIC NEGATIVE: 1
ALLERGIC/IMMUNOLOGIC NEGATIVE: 1
ENDOCRINE NEGATIVE: 1
ACTIVITY CHANGE: 0

## 2025-01-16 NOTE — PATIENT INSTRUCTIONS
It was a pleasure taking care of you today and appreciate your seeing us at our [facility]    Based on your leg symptoms, venous insufficiency studies and potential for clinical improvement, I am recommeding the followin) To start with the left leg will complete a GSV RFA.       To follow will plan for a right GSV RFA vs fibertip laser with Varithena       Your third procedure will include right leg Varithena with a possible phlebectomy   2) Please bring your compression socks to the day of the procedure.   3) We will proceed with insurance approval and call you once we can proceed with scheduling your procedures.    Please call the office with any questions at [phone].   For Vein Center specific questions, particularly insurance related questions of booking your [place] intervention, you can call [phone] or email at [email or web address]    You can speak directly to my Vein Center Nurse Coordinator, Ginger Finney, for specific questions.

## 2025-01-21 DIAGNOSIS — I83.893 VARICOSE VEINS OF BILATERAL LOWER EXTREMITIES WITH OTHER COMPLICATIONS: ICD-10-CM

## 2025-04-01 ENCOUNTER — PROCEDURE VISIT (OUTPATIENT)
Dept: VASCULAR SURGERY | Facility: CLINIC | Age: 55
End: 2025-04-01
Payer: COMMERCIAL

## 2025-04-01 VITALS
HEART RATE: 69 BPM | WEIGHT: 212.9 LBS | SYSTOLIC BLOOD PRESSURE: 154 MMHG | BODY MASS INDEX: 32.27 KG/M2 | RESPIRATION RATE: 18 BRPM | HEIGHT: 68 IN | DIASTOLIC BLOOD PRESSURE: 99 MMHG

## 2025-04-01 DIAGNOSIS — I83.812 VARICOSE VEINS OF LEG WITH PAIN, LEFT: ICD-10-CM

## 2025-04-01 DIAGNOSIS — I83.893 VARICOSE VEINS OF BILATERAL LOWER EXTREMITIES WITH OTHER COMPLICATIONS: ICD-10-CM

## 2025-04-01 DIAGNOSIS — I83.893 VARICOSE VEINS OF BILATERAL LOWER EXTREMITIES WITH OTHER COMPLICATIONS: Primary | ICD-10-CM

## 2025-04-01 PROCEDURE — 36475 ENDOVENOUS RF 1ST VEIN: CPT | Performed by: SURGERY

## 2025-04-01 ASSESSMENT — ENCOUNTER SYMPTOMS
LOSS OF SENSATION IN FEET: 0
DEPRESSION: 0
OCCASIONAL FEELINGS OF UNSTEADINESS: 0

## 2025-04-01 NOTE — PROGRESS NOTES
Ted Salamanca Jr. was brought into the PROCEDURAL ROOM room.   After timeout and verification of name, , mrn and allergies, the procedure was initiated.   Verification was additionally obtained by reviewing pre-procedural venous insufficiency studies.     The left leg was prepped and draped in standard fashion with chlorhexadine.   On table Duplex ultrasound was used to map out the insufficient saphenous vein, rule out any new changes at the groin level and access was determined. The GSV appeared to join the deep system in the SFJ.       Ultrasound guidance was again used to localize the access site.   This was at the DISTAL THIGH level. TUMESCENT ANESTHESIA was injected as a local anesthetic in the subcutaneous tissues at the target location. Using ultrasound guidance, access was gained at with the 19 gauge thin walled access needle and followed by introduction of a short guidewire, location confirmed with ultrasound. a micropuncture was used first to facilitate access. A small, 2 mm incision was made at the access site to allow for introduction and placement of the 6 Fr x7cm introducer/dilator. The dilator and guidewire were removed. The 8/60 cm RFA probe device was then inserted up along the ____GSV to the SFJ level under US. . CATHETER WAS PLACED ~2.5CM FROM THE SFJ.  We then proceeded with tumescent anesthesia for a total of 350 cc of volume under US guidance. TIP OF CATHETER WAS RECHECKED AND CONFIRMED TO NOT HAVE MOVED.  RFA was performed twice cephalad and 5 cycles were performed for a total of 1:40 MINUTES.      TREATMENT LENGTH: 38.5cm    Physical Exam  Musculoskeletal:        Legs:        Following this, the sheath and catheter was removed and hemostasis was achieved with manual compression. steri strips were applied over the wound.  gauze and tegaderm were applied over this. Ultrasound confirmed complete coaptation and closure of the treated segments of the GSV, and the absence of any DVT as  well.    The drapes were removed and the patient cleaned and prepared for discharge.   Post op ultrasound check is scheduled for 48-72 hours and the patient was given written post-op instructions.    STAFF: YEN OSHEA PO, BYRNES   START: 1336  END:  1421    NOTABLE FINDINGS: we evaluted the right leg today and can do short catheter RFA of the right GSV and thigh recurrent VV varithena with calf vv phlebectomy (~10) vs GSV RFA and phlebectomy (~10-20)    Skyler Oshea MD, MHS, RPVI  , [employer]  Director, [facility], [facility] [place]  Co-Director, Vascular Laboratories, [facility] [place]  Division of Vascular Surgery and Endovascular Therapy  [facility]

## 2025-04-08 ENCOUNTER — HOSPITAL ENCOUNTER (OUTPATIENT)
Dept: VASCULAR MEDICINE | Facility: CLINIC | Age: 55
Discharge: HOME | End: 2025-04-08
Payer: COMMERCIAL

## 2025-04-08 DIAGNOSIS — I83.893 VARICOSE VEINS OF BILATERAL LOWER EXTREMITIES WITH OTHER COMPLICATIONS: ICD-10-CM

## 2025-04-08 PROCEDURE — 93971 EXTREMITY STUDY: CPT

## 2025-04-08 PROCEDURE — 93971 EXTREMITY STUDY: CPT | Performed by: SURGERY

## 2025-04-24 DIAGNOSIS — I10 HYPERTENSION, UNSPECIFIED TYPE: ICD-10-CM

## 2025-04-28 RX ORDER — LOSARTAN POTASSIUM AND HYDROCHLOROTHIAZIDE 12.5; 1 MG/1; MG/1
1 TABLET ORAL NIGHTLY
Qty: 90 TABLET | Refills: 3 | Status: SHIPPED | OUTPATIENT
Start: 2025-04-28

## 2025-04-28 RX ORDER — AMLODIPINE BESYLATE 5 MG/1
5 TABLET ORAL DAILY
Qty: 90 TABLET | Refills: 3 | Status: SHIPPED | OUTPATIENT
Start: 2025-04-28

## 2025-04-28 ASSESSMENT — PROMIS GLOBAL HEALTH SCALE
RATE_PHYSICAL_HEALTH: VERY GOOD
EMOTIONAL_PROBLEMS: NEVER
CARRYOUT_PHYSICAL_ACTIVITIES: COMPLETELY
CARRYOUT_SOCIAL_ACTIVITIES: VERY GOOD
RATE_MENTAL_HEALTH: EXCELLENT
RATE_QUALITY_OF_LIFE: VERY GOOD
RATE_GENERAL_HEALTH: VERY GOOD
RATE_AVERAGE_FATIGUE: MILD
RATE_SOCIAL_SATISFACTION: VERY GOOD
RATE_AVERAGE_PAIN: 2

## 2025-05-02 ENCOUNTER — APPOINTMENT (OUTPATIENT)
Dept: PRIMARY CARE | Facility: CLINIC | Age: 55
End: 2025-05-02
Payer: COMMERCIAL

## 2025-05-14 DIAGNOSIS — F41.1 PRE-OPERATIVE ANXIETY: Primary | ICD-10-CM

## 2025-05-14 RX ORDER — ALPRAZOLAM 0.5 MG/1
0.5 TABLET ORAL
Qty: 2 TABLET | Refills: 0 | Status: SHIPPED | OUTPATIENT
Start: 2025-05-14 | End: 2025-05-14

## 2025-05-20 ENCOUNTER — PROCEDURE VISIT (OUTPATIENT)
Dept: VASCULAR SURGERY | Facility: CLINIC | Age: 55
End: 2025-05-20
Payer: COMMERCIAL

## 2025-05-20 VITALS
WEIGHT: 212 LBS | BODY MASS INDEX: 32.13 KG/M2 | HEART RATE: 62 BPM | DIASTOLIC BLOOD PRESSURE: 83 MMHG | HEIGHT: 68 IN | SYSTOLIC BLOOD PRESSURE: 128 MMHG

## 2025-05-20 DIAGNOSIS — I83.811 VARICOSE VEINS OF LEG WITH PAIN, RIGHT: Primary | ICD-10-CM

## 2025-05-20 DIAGNOSIS — I87.2 EDEMA OF RIGHT LOWER EXTREMITY DUE TO PERIPHERAL VENOUS INSUFFICIENCY: ICD-10-CM

## 2025-05-20 PROCEDURE — 36465 NJX NONCMPND SCLRSNT 1 VEIN: CPT | Performed by: SURGERY

## 2025-05-20 PROCEDURE — 37799 UNLISTED PX VASCULAR SURGERY: CPT | Performed by: SURGERY

## 2025-05-20 PROCEDURE — 36475 ENDOVENOUS RF 1ST VEIN: CPT | Performed by: SURGERY

## 2025-05-20 ASSESSMENT — ENCOUNTER SYMPTOMS
OCCASIONAL FEELINGS OF UNSTEADINESS: 0
DEPRESSION: 0
LOSS OF SENSATION IN FEET: 0

## 2025-05-20 NOTE — PROGRESS NOTES
Ted Salamanca  was brought into the vascular lab procedural room.   After timeout and verification of name, , mrn and allergies, the procedure was initiated.   Verification was additionally obtained by reviewing pre-procedural venous insufficiency studies.     The right leg was prepped and draped in standard fashion with chlorhexadine.   On table Duplex ultrasound was used to map out the insufficient saphenous vein, rule out any new changes at the groin level and access was determined. The GSV  appeared to join the deep system in the SFJ.       Ultrasound guidance was again used to localize the access site.   This was at the UPPER MEDIAL THIGH level. TUMESCENT ANESTHESIA was injected as a local anesthetic in the subcutaneous tissues at the target location. Using ultrasound guidance, access was gained at with the 19 gauge thin walled access needle and followed by introduction of a short guidewire, location confirmed with ultrasound. a micropuncture was used first to facilitate access. A small, 2 mm incision was made at the access site to allow for introduction and placement of the 7 Fr x7cm introducer/dilator. The dilator and guidewire were removed. The 3/60 cm RFA probe device was then inserted up along the GSV to the SFJ level under US.  CATHETER WAS PLACED ~2.5CM FROM THE SFJ.  We then proceeded with tumescent anesthesia for a total of 200 cc of volume under US guidance. TIP OF CATHETER WAS RECHECKED AND CONFIRMED TO NOT HAVE MOVED.  RFA was performed twice cephalad and 4 cycles were performed for a total of 1:20 MINUTES.      TREATMENT LENGTH: 16cm    VARITHENA:  After RFA we proceeded to cannulate 2 areas for Varithena.  This was based on duplex mapping and tortuosity and depth of recurrent veins.  The largest diameter vein was 6.8 mm.  We used 2 23-gauge butterfly needles to cannulate at the medial distal calf and the anterior distal thigh.    We then proceeded to perform tumescent anesthetic along the  entire course of the veins both those that will be treated with Varithena and phlebectomy.  Varithena was then administered to the thigh component for total of 6 cc and to the calf component for total of 5 cc in the calf.  In the medial distal thigh we compressed a  that led to the recurrence in the calf and in the groin we compressed the outflow near the groin and held for 2 minutes and then pumped of the foot and confirmed good distribution of foam.    PHLEBECTOMY  After the ablation and Varithena, we proceeded to do serial stab phlebectomy making small <1cm incision with an 11 blade and removing the veins with hemostats and hook combination. Veins were removed in segments. After all segments were removed, the wounds were closed with Mastisol and Steri-Strips. A total of <10 stab phlebectomies were performed.    Following this, the sheath and catheter was removed and hemostasis was achieved with manual compression. steri strips were applied over the wound.  gauze and tegaderm were applied over this. Ultrasound confirmed complete coaptation and closure of the treated segments of the GSV, and the absence of any DVT as well.    The drapes were removed and the patient cleaned and prepared for discharge.   Post op ultrasound check is scheduled for 48-72 hours and the patient was given written post-op instructions.      NOTABLE FINDINGS: WILL PLAN TO SEE HIM IN FOLLOW UP FOR POSSIBLE DRAINAGE AND THEN DECIDE IF HE NEEDS MORE VARITHENA ON THE RIGHT AND RE-EVAL ANY NEEDS ON THE LEFT.     Physical Exam  Musculoskeletal:        Legs:        Skyler Oshea MD, MHS, RPVI  , [employer]  Director, [facility], [facility] [place]  Co-Director, Vascular Laboratories, [facility] [place]  Division of Vascular Surgery and Endovascular Therapy  [facility]

## 2025-05-27 ENCOUNTER — HOSPITAL ENCOUNTER (OUTPATIENT)
Dept: VASCULAR MEDICINE | Facility: CLINIC | Age: 55
Discharge: HOME | End: 2025-05-27
Payer: COMMERCIAL

## 2025-05-27 DIAGNOSIS — I83.893 VARICOSE VEINS OF BILATERAL LOWER EXTREMITIES WITH OTHER COMPLICATIONS: ICD-10-CM

## 2025-05-27 PROCEDURE — 93971 EXTREMITY STUDY: CPT | Performed by: SURGERY

## 2025-05-27 PROCEDURE — 93971 EXTREMITY STUDY: CPT

## 2025-06-10 ENCOUNTER — OFFICE VISIT (OUTPATIENT)
Dept: VASCULAR SURGERY | Facility: CLINIC | Age: 55
End: 2025-06-10
Payer: COMMERCIAL

## 2025-06-10 VITALS
BODY MASS INDEX: 32.83 KG/M2 | SYSTOLIC BLOOD PRESSURE: 136 MMHG | DIASTOLIC BLOOD PRESSURE: 85 MMHG | HEIGHT: 68 IN | HEART RATE: 75 BPM | WEIGHT: 216.6 LBS

## 2025-06-10 DIAGNOSIS — I83.893 VARICOSE VEINS OF BILATERAL LOWER EXTREMITIES WITH OTHER COMPLICATIONS: Primary | ICD-10-CM

## 2025-06-10 PROCEDURE — 99214 OFFICE O/P EST MOD 30 MIN: CPT | Performed by: NURSE PRACTITIONER

## 2025-06-10 PROCEDURE — 1036F TOBACCO NON-USER: CPT | Performed by: NURSE PRACTITIONER

## 2025-06-10 PROCEDURE — 3075F SYST BP GE 130 - 139MM HG: CPT | Performed by: NURSE PRACTITIONER

## 2025-06-10 PROCEDURE — 3008F BODY MASS INDEX DOCD: CPT | Performed by: NURSE PRACTITIONER

## 2025-06-10 PROCEDURE — 3079F DIAST BP 80-89 MM HG: CPT | Performed by: NURSE PRACTITIONER

## 2025-06-10 ASSESSMENT — COLUMBIA-SUICIDE SEVERITY RATING SCALE - C-SSRS
6. HAVE YOU EVER DONE ANYTHING, STARTED TO DO ANYTHING, OR PREPARED TO DO ANYTHING TO END YOUR LIFE?: NO
2. HAVE YOU ACTUALLY HAD ANY THOUGHTS OF KILLING YOURSELF?: NO
1. IN THE PAST MONTH, HAVE YOU WISHED YOU WERE DEAD OR WISHED YOU COULD GO TO SLEEP AND NOT WAKE UP?: NO

## 2025-06-10 ASSESSMENT — ENCOUNTER SYMPTOMS
DEPRESSION: 0
ROS SKIN COMMENTS: BLE VARICOSE VEINS
LOSS OF SENSATION IN FEET: 0
MUSCULOSKELETAL NEGATIVE: 1
UNEXPECTED WEIGHT CHANGE: 0
ACTIVITY CHANGE: 0
PSYCHIATRIC NEGATIVE: 1
OCCASIONAL FEELINGS OF UNSTEADINESS: 0
EYES NEGATIVE: 1
NEUROLOGICAL NEGATIVE: 1
SHORTNESS OF BREATH: 0
ENDOCRINE NEGATIVE: 1
GASTROINTESTINAL NEGATIVE: 1
CHEST TIGHTNESS: 0
APPETITE CHANGE: 0
PALPITATIONS: 0
ALLERGIC/IMMUNOLOGIC NEGATIVE: 1
HEMATOLOGIC/LYMPHATIC NEGATIVE: 1

## 2025-06-10 NOTE — PATIENT INSTRUCTIONS
It was a pleasure taking care of you today and appreciate your seeing us at our [facility] [place] [facility].     Today's plan is as follows:  1) Elevate your legs at rest  2) Wear 20-30 mmHg compression stockings, on during the day when standing, off while sleeping  3) Follow up in 3 months or sooner as needed    Please call the office with any questions at [phone].   You can speak to our secretaries or our clinical nurses for specific questions.   For Vein Center specific questions, you can also call [phone] or email at [email or web address]  If you need coordinating your appointments and testing you can do these at the  or by calling my office shortly after your visit.

## 2025-06-10 NOTE — PROGRESS NOTES
"F/U REASON: BLE varicose veins    CURRENT ENCOUNTER:  Ted Salamanca Jr. is 55 y.o. male here for follow up of BLE varicose veins. He notes tenderness to the right leg along the sclerotherapy site. He overall notices less pain to the legs    Meds:   Current Medications[1]    Allergies:   RX Allergies[2]    ROS:  Review of Systems   Constitutional:  Negative for activity change, appetite change and unexpected weight change.   HENT: Negative.     Eyes: Negative.    Respiratory:  Negative for chest tightness and shortness of breath.    Cardiovascular:  Negative for chest pain and palpitations.   Gastrointestinal: Negative.    Endocrine: Negative.    Genitourinary: Negative.    Musculoskeletal: Negative.    Skin:         BLE varicose veins   Allergic/Immunologic: Negative.    Neurological: Negative.    Hematological: Negative.    Psychiatric/Behavioral: Negative.       otherwise unremarkable    Objective:  Vitals:  Vitals:    06/10/25 0802   BP: 136/85   Pulse:    /85   Pulse 75   Ht 1.727 m (5' 8\")   Wt 98.2 kg (216 lb 9.6 oz)   BMI 32.93 kg/m²      Physical Exam  Vitals reviewed.   HENT:      Head: Normocephalic and atraumatic.      Nose: Nose normal.      Mouth/Throat:      Mouth: Mucous membranes are moist.   Eyes:      Conjunctiva/sclera: Conjunctivae normal.   Cardiovascular:      Rate and Rhythm: Normal rate.      Pulses: Normal pulses.   Pulmonary:      Effort: Pulmonary effort is normal.   Musculoskeletal:         General: Normal range of motion.      Cervical back: Normal range of motion.   Skin:     General: Skin is warm and dry.      Capillary Refill: Capillary refill takes less than 2 seconds.      Comments: BLE hyperpigmentation, varicose veins   Neurological:      General: No focal deficit present.      Mental Status: He is alert and oriented to person, place, and time.   Psychiatric:         Mood and Affect: Mood normal.         Behavior: Behavior normal.         Thought Content: Thought content " normal.         Judgment: Judgment normal.     Labs:  Lab Results   Component Value Date    WBC 7.2 10/06/2023    WBC 6.7 10/03/2022    WBC 6.6 10/07/2021    HGB 16.2 10/06/2023    HGB 16.1 10/03/2022    HGB 15.8 10/07/2021    HCT 47.9 10/06/2023    HCT 44.1 10/03/2022    HCT 45.7 10/07/2021    MCV 97 10/06/2023    MCV 92 10/03/2022    MCV 93 10/07/2021     10/06/2023     Lab Results   Component Value Date    CREATININE 1.08 10/23/2024    CREATININE 1.14 04/09/2024    CREATININE 1.15 10/06/2023    BUN 16 10/23/2024    BUN 23 04/09/2024    BUN 13 10/06/2023     10/23/2024     04/09/2024     10/06/2023    K 4.0 10/23/2024    K 4.4 04/09/2024    K 3.7 10/06/2023     10/23/2024     04/09/2024     10/06/2023    CO2 28 10/23/2024    CO2 31 04/09/2024    CO2 28 10/06/2023       Imaging:                  Assessment & Plan:  Ted Salamanca Jr. is 55 y.o. male with history of HTN, RLS who is presents with painful varicose veins to BLE R>L.    12/5/24  RIGHT LEG CEAP: C3  RIGHT LEG VCSS SCORE: 9  LEFT LEG CEAP: C3  LEFT LEG VCSS SCORE: 8    6/10/25:   Patient presented for MICROTHROMBECTOMY  leg drained with 18 gauge needle: right side  prep agent: ETOH  locations drained: right thigh and medial calf  at completion of procedure, steri strips were applied and the patient's compression dressing.      It was a pleasure taking care of you today and appreciate your seeing us at our [facility] [place] [facility].     Today's plan is as follows:  1) Elevate your legs at rest  2) Wear 20-30 mmHg compression stockings, on during the day when standing, off while sleeping  3) Follow up in 3 months or sooner as needed.    >45 mins spent with the patient reviewing imaging, exam, completing microthrombectomy and making a plan of care    Yana Arcos DNP, APRN-CNP, AGPCNP-C, FNP-C, AGACNP-BC  Senior Nurse Practitioner, Vascular Surgery  [facility] for [facility]  [facility] Care, [facility] [place]  [facility]  [address] [address], Office [phone]             [1]   Current Outpatient Medications:     albuterol 90 mcg/actuation inhaler, Inhale 2 puffs every 6 hours if needed for wheezing., Disp: 18 g, Rfl: 0    amLODIPine (Norvasc) 5 mg tablet, TAKE 1 TABLET ONCE DAILY, Disp: 90 tablet, Rfl: 3    losartan-hydrochlorothiazide (Hyzaar) 100-12.5 mg tablet, TAKE 1 TABLET AT BEDTIME, Disp: 90 tablet, Rfl: 3    rOPINIRole (Requip) 0.25 mg tablet, Take 1 tablet (0.25 mg) by mouth once daily at bedtime. For restless leg syndrome, Disp: 90 tablet, Rfl: 3    tadalafil (Cialis) 10 mg tablet, Take 1 tablet (10 mg) by mouth once daily as needed (1 hour before activity as needed)., Disp: , Rfl:     ALPRAZolam (Xanax) 0.5 mg tablet, Take 1 tablet (0.5 mg) by mouth 1 time if needed for anxiety (PLEASE BRING TO PROCEDURE TO TAKE ON SITE) for up to 1 dose., Disp: 2 tablet, Rfl: 0    fluticasone (Flonase) 50 mcg/actuation nasal spray, Administer 1 spray into each nostril once daily. Shake gently. Before first use, prime pump. After use, clean tip and replace cap., Disp: 16 g, Rfl: 0  [2] No Known Allergies

## 2025-08-16 DIAGNOSIS — G25.81 RLS (RESTLESS LEGS SYNDROME): ICD-10-CM

## 2025-08-19 RX ORDER — ROPINIROLE 0.25 MG/1
0.25 TABLET, FILM COATED ORAL NIGHTLY
Qty: 90 TABLET | Refills: 0 | Status: SHIPPED | OUTPATIENT
Start: 2025-08-19

## 2025-08-28 ENCOUNTER — APPOINTMENT (OUTPATIENT)
Dept: PRIMARY CARE | Facility: CLINIC | Age: 55
End: 2025-08-28
Payer: COMMERCIAL

## 2025-08-28 PROBLEM — J06.9 URI WITH COUGH AND CONGESTION: Status: RESOLVED | Noted: 2023-02-02 | Resolved: 2025-08-28

## 2025-08-28 PROBLEM — R47.1 DYSARTHRIA: Status: ACTIVE | Noted: 2025-08-28

## 2025-08-28 PROBLEM — J45.20 MILD INTERMITTENT ASTHMA WITHOUT COMPLICATION (HHS-HCC): Status: ACTIVE | Noted: 2023-02-02

## 2025-08-28 PROBLEM — U07.1 COVID-19 VIRUS INFECTION: Status: RESOLVED | Noted: 2023-02-02 | Resolved: 2025-08-28

## 2025-08-28 PROBLEM — J01.00 ACUTE NON-RECURRENT MAXILLARY SINUSITIS: Status: RESOLVED | Noted: 2023-02-02 | Resolved: 2025-08-28

## 2025-08-28 PROBLEM — Z00.00 WELL ADULT EXAM: Status: ACTIVE | Noted: 2025-08-28

## 2025-08-28 PROBLEM — H66.91 OTITIS MEDIA, RIGHT: Status: RESOLVED | Noted: 2023-02-02 | Resolved: 2025-08-28

## 2025-08-31 LAB
ALBUMIN SERPL-MCNC: 4.6 G/DL (ref 3.6–5.1)
ALP SERPL-CCNC: 51 U/L (ref 35–144)
ALT SERPL-CCNC: 17 U/L (ref 9–46)
ANION GAP SERPL CALCULATED.4IONS-SCNC: 11 MMOL/L (CALC) (ref 7–17)
APPEARANCE UR: CLEAR
AST SERPL-CCNC: 16 U/L (ref 10–35)
BACTERIA #/AREA URNS HPF: ABNORMAL /HPF
BACTERIA UR CULT: ABNORMAL
BACTERIA UR CULT: ABNORMAL
BASOPHILS # BLD AUTO: 45 CELLS/UL (ref 0–200)
BASOPHILS NFR BLD AUTO: 0.4 %
BILIRUB SERPL-MCNC: 0.6 MG/DL (ref 0.2–1.2)
BILIRUB UR QL STRIP: NEGATIVE
BUN SERPL-MCNC: 18 MG/DL (ref 7–25)
CALCIUM SERPL-MCNC: 9.3 MG/DL (ref 8.6–10.3)
CAOX CRY #/AREA URNS HPF: ABNORMAL /HPF
CHLORIDE SERPL-SCNC: 103 MMOL/L (ref 98–110)
CHOLEST SERPL-MCNC: 168 MG/DL
CHOLEST/HDLC SERPL: 3.4 (CALC)
CO2 SERPL-SCNC: 26 MMOL/L (ref 20–32)
COLOR UR: ABNORMAL
CREAT SERPL-MCNC: 0.98 MG/DL (ref 0.7–1.3)
EGFRCR SERPLBLD CKD-EPI 2021: 92 ML/MIN/1.73M2
EOSINOPHIL # BLD AUTO: 101 CELLS/UL (ref 15–500)
EOSINOPHIL NFR BLD AUTO: 0.9 %
ERYTHROCYTE [DISTWIDTH] IN BLOOD BY AUTOMATED COUNT: 12.1 % (ref 11–15)
EST. AVERAGE GLUCOSE BLD GHB EST-MCNC: 100 MG/DL
EST. AVERAGE GLUCOSE BLD GHB EST-SCNC: 5.5 MMOL/L
GLUCOSE SERPL-MCNC: 96 MG/DL (ref 65–99)
GLUCOSE UR QL STRIP: NEGATIVE
HBA1C MFR BLD: 5.1 %
HCT VFR BLD AUTO: 46.4 % (ref 38.5–50)
HDLC SERPL-MCNC: 50 MG/DL
HGB BLD-MCNC: 15.9 G/DL (ref 13.2–17.1)
HGB UR QL STRIP: NEGATIVE
HYALINE CASTS #/AREA URNS LPF: ABNORMAL /LPF
KETONES UR QL STRIP: ABNORMAL
LDLC SERPL CALC-MCNC: 99 MG/DL (CALC)
LEUKOCYTE ESTERASE UR QL STRIP: ABNORMAL
LYMPHOCYTES # BLD AUTO: 1568 CELLS/UL (ref 850–3900)
LYMPHOCYTES NFR BLD AUTO: 14 %
MCH RBC QN AUTO: 32.3 PG (ref 27–33)
MCHC RBC AUTO-ENTMCNC: 34.3 G/DL (ref 32–36)
MCV RBC AUTO: 94.1 FL (ref 80–100)
MONOCYTES # BLD AUTO: 1019 CELLS/UL (ref 200–950)
MONOCYTES NFR BLD AUTO: 9.1 %
NEUTROPHILS # BLD AUTO: 8467 CELLS/UL (ref 1500–7800)
NEUTROPHILS NFR BLD AUTO: 75.6 %
NITRITE UR QL STRIP: NEGATIVE
NONHDLC SERPL-MCNC: 118 MG/DL (CALC)
PH UR STRIP: 6.5 [PH] (ref 5–8)
PLATELET # BLD AUTO: 240 THOUSAND/UL (ref 140–400)
PMV BLD REES-ECKER: 10.3 FL (ref 7.5–12.5)
POTASSIUM SERPL-SCNC: 3.4 MMOL/L (ref 3.5–5.3)
PROT SERPL-MCNC: 7.2 G/DL (ref 6.1–8.1)
PROT UR QL STRIP: ABNORMAL
PSA SERPL-MCNC: 0.86 NG/ML
RBC # BLD AUTO: 4.93 MILLION/UL (ref 4.2–5.8)
RBC #/AREA URNS HPF: ABNORMAL /HPF
SERVICE CMNT-IMP: ABNORMAL
SODIUM SERPL-SCNC: 140 MMOL/L (ref 135–146)
SP GR UR STRIP: 1.02 (ref 1–1.03)
SQUAMOUS #/AREA URNS HPF: ABNORMAL /HPF
TRIGL SERPL-MCNC: 95 MG/DL
TSH SERPL-ACNC: 3.61 MIU/L (ref 0.4–4.5)
WBC # BLD AUTO: 11.2 THOUSAND/UL (ref 3.8–10.8)
WBC #/AREA URNS HPF: ABNORMAL /HPF

## 2025-09-24 ENCOUNTER — APPOINTMENT (OUTPATIENT)
Dept: VASCULAR SURGERY | Facility: CLINIC | Age: 55
End: 2025-09-24
Payer: COMMERCIAL

## 2026-03-03 ENCOUNTER — APPOINTMENT (OUTPATIENT)
Dept: PRIMARY CARE | Facility: CLINIC | Age: 56
End: 2026-03-03
Payer: COMMERCIAL

## 2026-08-28 ENCOUNTER — APPOINTMENT (OUTPATIENT)
Dept: PRIMARY CARE | Facility: CLINIC | Age: 56
End: 2026-08-28
Payer: COMMERCIAL